# Patient Record
Sex: FEMALE | URBAN - METROPOLITAN AREA
[De-identification: names, ages, dates, MRNs, and addresses within clinical notes are randomized per-mention and may not be internally consistent; named-entity substitution may affect disease eponyms.]

---

## 2023-06-05 ENCOUNTER — APPOINTMENT (OUTPATIENT)
Dept: URBAN - METROPOLITAN AREA CLINIC 191 | Age: 38
Setting detail: DERMATOLOGY
End: 2023-07-02

## 2023-06-05 DIAGNOSIS — L30.8 OTHER SPECIFIED DERMATITIS: ICD-10-CM

## 2023-06-05 DIAGNOSIS — L68.0 HIRSUTISM: ICD-10-CM

## 2023-06-05 DIAGNOSIS — L70.0 ACNE VULGARIS: ICD-10-CM

## 2023-06-05 DIAGNOSIS — L70.5 ACNÉ EXCORIÉE: ICD-10-CM

## 2023-06-05 PROCEDURE — OTHER MIPS QUALITY: OTHER

## 2023-06-05 PROCEDURE — OTHER COUNSELING: OTHER

## 2023-06-05 PROCEDURE — OTHER PRESCRIPTION: OTHER

## 2023-06-05 PROCEDURE — 99204 OFFICE O/P NEW MOD 45 MIN: CPT

## 2023-06-05 PROCEDURE — OTHER PRESCRIPTION MEDICATION MANAGEMENT: OTHER

## 2023-06-05 RX ORDER — CLINDAMYCIN PHOSPHATE 10 MG/ML
APPLY LOTION TOPICAL BID
Qty: 60 | Refills: 3 | Status: ERX | COMMUNITY
Start: 2023-06-05

## 2023-06-05 RX ORDER — EFLORNITHINE HYDROCHLORIDE 139 MG/G
APPLY CREAM TOPICAL BID
Qty: 45 | Refills: 5 | Status: ERX | COMMUNITY
Start: 2023-06-05

## 2023-06-05 RX ORDER — MUPIROCIN 20 MG/G
APPLY OINTMENT TOPICAL BID
Qty: 22 | Refills: 3 | Status: ERX | COMMUNITY
Start: 2023-06-05

## 2023-06-05 RX ORDER — TRETIONIN 0.25 MG/G
APPLY CREAM TOPICAL
Qty: 45 | Refills: 3 | Status: ERX | COMMUNITY
Start: 2023-06-05

## 2023-06-05 ASSESSMENT — LOCATION ZONE DERM
LOCATION ZONE: FACE
LOCATION ZONE: ARM

## 2023-06-05 ASSESSMENT — LOCATION SIMPLE DESCRIPTION DERM
LOCATION SIMPLE: RIGHT SHOULDER
LOCATION SIMPLE: CHIN
LOCATION SIMPLE: RIGHT CHEEK
LOCATION SIMPLE: LEFT SHOULDER
LOCATION SIMPLE: LEFT CHEEK
LOCATION SIMPLE: RIGHT POSTERIOR UPPER ARM

## 2023-06-05 ASSESSMENT — LOCATION DETAILED DESCRIPTION DERM
LOCATION DETAILED: RIGHT POSTERIOR SHOULDER
LOCATION DETAILED: RIGHT CENTRAL BUCCAL CHEEK
LOCATION DETAILED: LEFT CHIN
LOCATION DETAILED: RIGHT PROXIMAL POSTERIOR UPPER ARM
LOCATION DETAILED: LEFT POSTERIOR SHOULDER
LOCATION DETAILED: LEFT SUPERIOR CENTRAL BUCCAL CHEEK

## 2023-06-05 NOTE — PROCEDURE: PRESCRIPTION MEDICATION MANAGEMENT
Detail Level: Zone
Plan: Vaniqa cream apply twice daily to areas of facial hair\\nConsider shaving instead of plucking, use sensitive skin products, fragrance free and alcohol free (Vanicream, Radha) and use no more than a double blade razor, shave with the grain of the hair
Render In Strict Bullet Format?: No
Plan: Mupirocin ointment apply twice daily to excoriations \\nDiscontinue picking
Plan: Neutrogena Pore Cleanser/Mask (BPO 3.5%) - wash face twice daily\\nClindamycin lotion - apply to face twice daily\\nSunscreen with Zinc base (ex. Olay Complete SPF 15 lotion or 30 cream) after applying Clindamycin lotion\\Jessie bedtime wash face with Cetaphil or Cerave gentle cleanser\\nTretinoin 0.025% cream - apply each night as tolerated (start every 3rd night, advance slowly)
Plan: Discontinue plucking \\nMupirocin ointment apply twice daily to any excoriation

## 2023-12-21 NOTE — PROCEDURE: COUNSELING
Head, normocephalic, atraumatic, Face, Face within normal limits, Ears, External ears within normal limits, Nose/Nasopharynx, External nose normal appearance, nares patent, no nasal discharge, Mouth and Throat, Oral cavity appearance normal, Lips, Appearance normal
Patient Specific Counseling (Will Not Stick From Patient To Patient): Has appointment with Endocrinologist this month, recommended levels
Detail Level: Simple
Detail Level: Detailed